# Patient Record
Sex: FEMALE | Race: WHITE | NOT HISPANIC OR LATINO | Employment: OTHER | URBAN - METROPOLITAN AREA
[De-identification: names, ages, dates, MRNs, and addresses within clinical notes are randomized per-mention and may not be internally consistent; named-entity substitution may affect disease eponyms.]

---

## 2024-05-07 ENCOUNTER — TELEPHONE (OUTPATIENT)
Dept: VASCULAR SURGERY | Facility: CLINIC | Age: 84
End: 2024-05-07
Payer: MEDICARE

## 2024-05-07 NOTE — TELEPHONE ENCOUNTER
Received Faxed from [facility] dermatology from Dr. Jamia Escobedo for patient to see [facility] for varicose veins and swelling.     JIMMY STRICKLAND,   [facility]

## 2024-05-13 NOTE — TELEPHONE ENCOUNTER
Patient's only number on file is not in service. Unable to call regarding 2nd attempt to schedule.       JIMMY STRICKLAND,   [facility]

## 2024-06-24 ENCOUNTER — OFFICE VISIT (OUTPATIENT)
Dept: VASCULAR SURGERY | Facility: CLINIC | Age: 84
End: 2024-06-24
Payer: MEDICARE

## 2024-06-24 DIAGNOSIS — I83.92 ASYMPTOMATIC VARICOSE VEINS OF LEFT LOWER EXTREMITY: Primary | ICD-10-CM

## 2024-06-24 PROCEDURE — 99203 OFFICE O/P NEW LOW 30 MIN: CPT | Performed by: SURGERY

## 2024-06-24 RX ORDER — VENLAFAXINE 25 MG/1
12.5 TABLET ORAL 2 TIMES DAILY
COMMUNITY
Start: 2024-06-10

## 2024-06-24 RX ORDER — TRETINOIN 0.25 MG/G
CREAM TOPICAL AT BEDTIME
COMMUNITY
Start: 2024-06-12

## 2024-06-24 RX ORDER — ROSUVASTATIN CALCIUM 5 MG/1
5 TABLET, COATED ORAL
COMMUNITY

## 2024-06-24 NOTE — PROGRESS NOTES
[facility]: Jenna Lewis DIPAK Paulino comes to see us today for evaluation of left leg varicose veins per recommendation of Dr. Wen Schneider of dermatology.  They felt that these varicosities should be evaluated.  Patient is under the care of Dr. Bradford.    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     Able to drive without difficulty.  Better on present medications    GERD    Non-smoker.  Remains very active.    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    ROS: Patient noted varicosities noted left medial calf 10 to 12 years ago.  These have slightly increased over time but remains completely asymptomatic.  No history of phlebitis, DVT, bleeding, ulcerations, pain to the area.  No significant varicosities right leg.    2024 laboratory: SCr= 0.92 glucose= 96 LDL-101 HDL = 70 A1c= 6.2 Hgb= 12.3  Exam: Alert and appropriate.  Very pleasant.  Petite.   Chest= clear   Cardiovascular= regular rate   Extremities= no swelling.  No numbness.  Clustered varicosities left proximal medial    Calf likely coming off GSV.  GSV is not markedly dilated.    No visible or palpable varicosities right leg.    A few scattered spider veins.   +3 palpable DP pulses bilaterally left PT with a slightly weaker right PT      IMPRESSION: #1.  Asymptomatic left calf varicose veins.  I do not feel specific treatment is indicated due to her complete lack of symptoms.  Varicosities are relatively localized and small and even compression therapy would not be of any significant benefit.    I discussed the possibility that some of these varicosities become painful or even have an episode of phlebitis and if that is the case evaluation with a duplex ultrasound and possible surgical intervention should be performed at  that time.     #2.  Very well-controlled vascular risk factors.     #3.  No evidence of PAD with excellent pedal pulses.    Left leg VCSS=0 CEAP: C2    [facility] LEG DRAWING:        Over 30 minutes with patient today including chart review.  Calderon Shaffer MD

## 2024-06-24 NOTE — PROGRESS NOTES
"Date measured: 6/24/24  Measured by: Carie VALENTIN    Measurements:  Right Ankle: 24cm  Right Calf: 41cm  Right Thigh: 52.5cm    Left Ankle: 24.5cm  Left Calf: 40.5cm  Left Thigh: 52.5cm    Leg Length: 73cm  Calf Length: 42.5cm  Height: 5' 4\"  Shoe Size: 9    Carie Vivar      "

## 2024-06-24 NOTE — LETTER
2024      Debbie Paulino  [address]  [place] [address]      Dear Colleague,    Thank you for referring your patient, Debbie Paulino, to the [facility] [place]. Please see a copy of my visit note below.     [facility]: [place]    Debbie Paulino comes to see us today for evaluation of left leg varicose veins per recommendation of Dr. Wen Schneider of dermatology.  They felt that these varicosities should be evaluated.  Patient is under the care of Dr. Bradford.    PMH: Medications: Prilosec, Crestor, fish oil, aspirin   Medical: Hyperlipidemia on statin (previously on Pravachol--now on Crestor)    GERD    Non-smoker.  Remains very active.    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    ROS: Patient noted varicosities noted left medial calf 10 to 12 years ago.  These have slightly increased over time but remains completely asymptomatic.  No history of phlebitis, DVT, bleeding, ulcerations, pain to the area.  No significant varicosities right leg.    2024 laboratory: SCr= 0.92 glucose= 96 LDL-101 HDL = 70 A1c= 6.2 Hgb= 12.3  Exam: Alert and appropriate.  Very pleasant.  Petite.   Chest= clear   Cardiovascular= regular rate   Extremities= no swelling.  No numbness.  Clustered varicosities left proximal medial    Calf likely coming off GSV.  GSV is not markedly dilated.    No visible or palpable varicosities right leg.    A few scattered spider veins.   +3 palpable DP pulses bilaterally left PT with a slightly weaker right PT      IMPRESSION: #1.  Asymptomatic left calf varicose veins.  I do not feel specific treatment is indicated due to her complete lack of symptoms.  Varicosities are relatively localized and small and even compression therapy would not be of any significant benefit.    I discussed the possibility that some of these varicosities become painful or even have an episode of phlebitis and if that is the case  evaluation with a duplex ultrasound and possible surgical intervention should be performed at that time.     #2.  Very well-controlled vascular risk factors.     #3.  No evidence of PAD with excellent pedal pulses.    Left leg VCSS=0 CEAP: C2    [facility]IC LEG DRAWING:        Over 30 minutes with patient today including chart review.  Calderon Shaffer MD      Again, thank you for allowing me to participate in the care of your patient.        Sincerely,        Calderon Shaffer MD

## 2024-06-24 NOTE — NURSING NOTE
Patient Reported symptoms:    Right leg   Heaviness None of the time   Achiness None of the time   Swelling None of the time   Throbbing None of the time   Itching None of the time   Appearance Slightly noticeable   Impact on work/activities Symptoms but full able to participate    Left Leg   Heaviness None of the time   Achiness None of the time   Swelling None of the time   Throbbing None of the time   Itching None of the time   Appearance Slightly noticeable   Impact on work/activities Symptoms but full able to participate